# Patient Record
Sex: FEMALE | Race: BLACK OR AFRICAN AMERICAN | NOT HISPANIC OR LATINO | Employment: FULL TIME | URBAN - METROPOLITAN AREA
[De-identification: names, ages, dates, MRNs, and addresses within clinical notes are randomized per-mention and may not be internally consistent; named-entity substitution may affect disease eponyms.]

---

## 2023-12-04 ENCOUNTER — TRANSFERRED RECORDS (OUTPATIENT)
Dept: HEALTH INFORMATION MANAGEMENT | Facility: CLINIC | Age: 48
End: 2023-12-04
Payer: COMMERCIAL

## 2023-12-05 ENCOUNTER — HOSPITAL ENCOUNTER (OUTPATIENT)
Facility: AMBULATORY SURGERY CENTER | Age: 48
End: 2023-12-05
Attending: OPHTHALMOLOGY | Admitting: OPHTHALMOLOGY
Payer: COMMERCIAL

## 2023-12-05 ENCOUNTER — OFFICE VISIT (OUTPATIENT)
Dept: OPHTHALMOLOGY | Facility: CLINIC | Age: 48
End: 2023-12-05
Attending: OPHTHALMOLOGY
Payer: COMMERCIAL

## 2023-12-05 ENCOUNTER — PREP FOR PROCEDURE (OUTPATIENT)
Dept: OPHTHALMOLOGY | Facility: CLINIC | Age: 48
End: 2023-12-05

## 2023-12-05 ENCOUNTER — ANESTHESIA (OUTPATIENT)
Dept: SURGERY | Facility: AMBULATORY SURGERY CENTER | Age: 48
End: 2023-12-05
Payer: COMMERCIAL

## 2023-12-05 ENCOUNTER — ANESTHESIA EVENT (OUTPATIENT)
Dept: SURGERY | Facility: AMBULATORY SURGERY CENTER | Age: 48
End: 2023-12-05
Payer: COMMERCIAL

## 2023-12-05 ENCOUNTER — HOSPITAL ENCOUNTER (OUTPATIENT)
Facility: CLINIC | Age: 48
Discharge: HOME | End: 2023-12-05
Attending: OPHTHALMOLOGY | Admitting: OPHTHALMOLOGY
Payer: COMMERCIAL

## 2023-12-05 ENCOUNTER — HOSPITAL ENCOUNTER (OUTPATIENT)
Facility: AMBULATORY SURGERY CENTER | Age: 48
Discharge: HOME | End: 2023-12-05
Attending: OPHTHALMOLOGY
Payer: COMMERCIAL

## 2023-12-05 ENCOUNTER — TELEPHONE (OUTPATIENT)
Dept: OPHTHALMOLOGY | Facility: CLINIC | Age: 48
End: 2023-12-05

## 2023-12-05 ENCOUNTER — HOSPITAL ENCOUNTER (OUTPATIENT)
Facility: CLINIC | Age: 48
End: 2023-12-05
Attending: OPHTHALMOLOGY | Admitting: OPHTHALMOLOGY
Payer: COMMERCIAL

## 2023-12-05 VITALS — WEIGHT: 151.9 LBS | TEMPERATURE: 98.4 F | BODY MASS INDEX: 24.41 KG/M2 | HEIGHT: 66 IN | RESPIRATION RATE: 18 BRPM

## 2023-12-05 DIAGNOSIS — H33.21 RETINAL DETACHMENT, RIGHT: Primary | ICD-10-CM

## 2023-12-05 DIAGNOSIS — H33.21 RIGHT RETINAL DETACHMENT: ICD-10-CM

## 2023-12-05 PROCEDURE — 99204 OFFICE O/P NEW MOD 45 MIN: CPT | Mod: GC | Performed by: OPHTHALMOLOGY

## 2023-12-05 PROCEDURE — 99214 OFFICE O/P EST MOD 30 MIN: CPT | Performed by: OPHTHALMOLOGY

## 2023-12-05 PROCEDURE — 999N000001 HC CANCELLED SURGERY UP TO 15 MINS: Performed by: OPHTHALMOLOGY

## 2023-12-05 PROCEDURE — 92250 FUNDUS PHOTOGRAPHY W/I&R: CPT | Mod: 59 | Performed by: OPHTHALMOLOGY

## 2023-12-05 PROCEDURE — 92134 CPTRZ OPH DX IMG PST SGM RTA: CPT | Performed by: OPHTHALMOLOGY

## 2023-12-05 PROCEDURE — 99207 FUNDUS PHOTOS OU (BOTH EYES): CPT | Mod: 26 | Performed by: OPHTHALMOLOGY

## 2023-12-05 PROCEDURE — 999N000141 HC STATISTIC PRE-PROCEDURE NURSING ASSESSMENT: Performed by: OPHTHALMOLOGY

## 2023-12-05 ASSESSMENT — CONF VISUAL FIELD
OD_INFERIOR_NASAL_RESTRICTION: 0
OS_NORMAL: 1
OS_SUPERIOR_NASAL_RESTRICTION: 0
METHOD: COUNTING FINGERS
OD_SUPERIOR_TEMPORAL_RESTRICTION: 0
OS_INFERIOR_NASAL_RESTRICTION: 0
OD_INFERIOR_TEMPORAL_RESTRICTION: 0
OD_SUPERIOR_NASAL_RESTRICTION: 0
OS_INFERIOR_TEMPORAL_RESTRICTION: 0
OD_NORMAL: 1
OS_SUPERIOR_TEMPORAL_RESTRICTION: 0

## 2023-12-05 ASSESSMENT — TONOMETRY
OS_IOP_MMHG: 17
OD_IOP_MMHG: 11
IOP_METHOD: TONOPEN

## 2023-12-05 ASSESSMENT — SLIT LAMP EXAM - LIDS
COMMENTS: NORMAL
COMMENTS: NORMAL

## 2023-12-05 ASSESSMENT — VISUAL ACUITY
OD_CC: 20/50
OD_PH_CC+: +1
OS_CC: 20/20
METHOD: SNELLEN - LINEAR
OD_PH_CC: 20/40
CORRECTION_TYPE: GLASSES

## 2023-12-05 ASSESSMENT — CUP TO DISC RATIO
OS_RATIO: 0.4
OD_RATIO: 0.4

## 2023-12-05 ASSESSMENT — ACTIVITIES OF DAILY LIVING (ADL): ADLS_ACUITY_SCORE: 35

## 2023-12-05 ASSESSMENT — EXTERNAL EXAM - RIGHT EYE: OD_EXAM: NORMAL

## 2023-12-05 ASSESSMENT — EXTERNAL EXAM - LEFT EYE: OS_EXAM: NORMAL

## 2023-12-05 NOTE — PROGRESS NOTES
Pre-Operative H & P     CC:  Preoperative exam to assess for increased cardiopulmonary risk while undergoing surgery and anesthesia.    Date of Encounter: 12/5/2023  Primary Care Physician:  No Ref-Primary, Physician     Reason for visit:   Encounter Diagnosis   Name Primary?    Retinal detachment, right Yes       HPI  Camryn Sanon is a 48 year old female who presents for pre-operative H & P in preparation for retinal detachment repair with Dr. Guillermo today.    History is obtained from the patient and chart review      Hx of abnormal bleeding or anti-platelet use: None    Menstrual history: No LMP recorded.: Started 11/11/23.     Prior to Admission Medications  Current Outpatient Medications   Medication Sig Dispense Refill    NO ACTIVE MEDICATIONS       Prenatal Vit-Fe Fumarate-FA (PRENATAL MULTIVITAMIN WITH IRON) 28-0.8 MG TABS Take 1 tablet by mouth daily. 90 tablet 3       Family History  Family History   Problem Relation Age of Onset    Asthma Mother     C.A.D. No family hx of     Diabetes No family hx of     Hypertension No family hx of     Cerebrovascular Disease No family hx of     Breast Cancer No family hx of     Cancer - colorectal No family hx of     Glaucoma No family hx of     Macular Degeneration No family hx of        The complete review of systems is negative other than noted in the HPI or here.       Vitals:  - BP: 127/81  - HR: 113  - SpO2: 99%       Physical Exam  Constitutional: Awake, alert, cooperative, no apparent distress, and appears stated age.  Eyes: Pupils equal, round and reactive to light, extra ocular muscles intact, sclera clear, conjunctiva normal.  HENT: Normocephalic, oral pharynx with moist mucus membranes, good dentition.  Respiratory: Clear to auscultation bilaterally, no crackles or wheezing.  Cardiovascular: Regular rate and rhythm, normal S1 and S2, and no murmur noted.    GI: Normal bowel sounds, soft, non-distended, non-tender.  Lymph/Hematologic: No cervical  lymphadenopathy and no supraclavicular lymphadenopathy.  Genitourinary:  N/A  Skin: Warm and dry.  No rashes at anticipated surgical site.   Musculoskeletal: Full ROM of neck. There is no redness, warmth, or swelling of the joints. Gross motor strength is normal.    Neurologic: Awake, alert, oriented to name, place and time. Cranial nerves II-XII are grossly intact. Gait is normal.   Neuropsychiatric: Calm, cooperative. Normal affect.     PRIOR LABS/DIAGNOSTIC STUDIES:   All labs and imaging personally reviewed     The patient's records and results personally reviewed by this provider.     Outside records reviewed from: care everywhere    LAB/DIAGNOSTIC STUDIES TODAY:  None    ASSESSMENT and PLAN    Plan/Recommendations  Pt will be optimized for the proposed procedure.  See below for details on the assessment, risk, and preoperative recommendations     Eye:  - Past ocular history: No history of surgeries. Presenting for retinal detachment repair in the right eye.    NEUROLOGY  - No h/o CVA, TIA  - Post Op delirium risk factors:  Age     ENT  - No current airway concerns.  Will need to be reassessed day of surgery.  Mallampati: Anesthesia will assess  TM: Anesthesia will assess     CARDIAC  - No h/o CAD/NSTEMI, CHF  - Does not have HLD and has not been on low-dose statin by choice    No history of CHF, Ischemic heart disease, CVA or TIA       PULMONARY  History of allergies and asthma but does not require medications       - Denies asthma or inhaler use  - Tobacco History    Social History     Socioeconomic History    Marital status: Single     Spouse name: None    Number of children: None    Years of education: None    Highest education level: None   Tobacco Use    Smoking status: Never    Smokeless tobacco: Never   Substance and Sexual Activity    Alcohol use: No    Drug use: No    Sexual activity: Yes     Partners: Male     Comment: not using birth control   Other Topics Concern    Parent/sibling w/ CABG, MI or  angioplasty before 65F 55M? No         GI  Denies GERD     /RENAL  - No CKD     ENDOCRINE    There is no height or weight on file to calculate BMI.  - No history of Diabetes Mellitus or thyroid abnormalities     HEME  - No history of abnormal bleeding or antiplatelet use or anticoagulant use.       MSK  - No history of MSK conditions        Kiara Carr MD  Resident Physician, PGY-3  Department of Ophthalmology

## 2023-12-05 NOTE — PROGRESS NOTES
CC -   RD OD    INTERVAL HISTORY - Initial visit with me.  Having pain OD ~ 11/15/23 went to an eye clinic in [facility] ~11/27/23 started an eye gtt, had follow-up exam yesterday, found RD OD, referred to N  No trauma      [facility] -   Camryn FRANKY Sanon is a  48 year old year-old woman with h/o RD OD Dx 12/4/23, macula on, no trauma  Seen in clinic prior to planned surgery with     No DM, no HTn, mild asthma        PAST OCULAR SURGERY  None    RETINAL IMAGING:  OCT 12/5/23  OD - Mild ERM with temporal distortion of contour, no fluid, PVD  OS - retina normal, PHF attached      ASSESSMENT & PLAN    # RRD OD with GRT   - plan PPV/FGx  +/- SBP with SM today     - r/b/a d/w patient: vision loss, blindness, infection, bleeding   - retinal detachment, need for more surgeries, need for gas or oil bubble and bubble restrictions   - cataract, diplopia, refractive change   - persistent blurriness, distortion, or scotoma   - participation/performance by fellow or resident'       - Has not had anything to eat or drink since last night   - See additional note for pre-op H&P      # PVD OD, syneresis OS       # Tr NS OU   - NVS    return to clinic: POD1 with Dr. Mima Carr MD  Resident Physician, PGY-3  Department of Ophthalmology      ATTESTATION     Attending Physician Attestation:      Complete documentation of historical and exam elements from today's encounter can be found in the full encounter summary report (not reduplicated in this progress note).  I personally obtained the chief complaint(s) and history of present illness.  I confirmed and edited as necessary the review of systems, past medical/surgical history, family history, social history, and examination findings as documented by others; and I examined the patient myself.  I personally reviewed the relevant tests, images, and reports as documented above.  I personally reviewed the ophthalmic test(s) associated with this encounter, agree with the  interpretation(s) as documented by the resident/fellow, and have edited the corresponding report(s) as necessary.   I formulated and edited as necessary the assessment and plan and discussed the findings and management plan with the patient and family    Alejandra Augustin MD, PhD  , Vitreoretinal Surgery  Department of Ophthalmology  [facility]

## 2023-12-05 NOTE — PROGRESS NOTES
Patient went home, MD updated and will be in touch with the patient . Face to face time 20 mins in preop with the patient.

## 2023-12-05 NOTE — NURSING NOTE
Chief Complaints and History of Present Illnesses   Patient presents with    Retinal Detachment Evaluation     Chief Complaint(s) and History of Present Illness(es)       Retinal Detachment Evaluation              Laterality: right eye              Comments    Pt sent here from [facility] for RD Right eye. Pt has been NPO since about 9pm last night.  No changes in vision from yesterday to day. No eye pain today. No flashes or floaters.  No DM. Pt stopped all of her prescribed eye drops yesterday.    YANELIS Singer December 5, 2023 7:29 AM

## 2023-12-05 NOTE — PROGRESS NOTES
Patient arrived from the clinic, reports she did not know the extent of her surgery. She does not have anyone to drive her home or stay with her and would like to schedule for another day. MD updated with information

## 2023-12-05 NOTE — ANESTHESIA PREPROCEDURE EVALUATION
Anesthesia Pre-Procedure Evaluation    Patient: Camryn Sanon   MRN: [id number] : 1975        Procedure : Procedure(s):  VITRECTOMY, PARS PLANA APPROACH, USING 25-GAUGE INSTRUMENTS, possible air-fluid exchange, possible gas bubble, possible scleral buckle, possible endolaser          Past Medical History:   Diagnosis Date    Allergy     ASCUS on Pap smear 11    LSIL (low grade squamous intraepithelial lesion) on Pap smear 2011    colp bx neg      Past Surgical History:   Procedure Laterality Date    none        No Known Allergies   Social History     Tobacco Use    Smoking status: Never    Smokeless tobacco: Never   Substance Use Topics    Alcohol use: No      Wt Readings from Last 1 Encounters:   12 72.1 kg (159 lb)        Anesthesia Evaluation            ROS/MED HX  ENT/Pulmonary: Comment: Right Retinal Detachment      Neurologic:  - neg neurologic ROS     Cardiovascular:  - neg cardiovascular ROS     METS/Exercise Tolerance:  Comment: 4 Mets   Hematologic:  - neg hematologic  ROS     Musculoskeletal:  - neg musculoskeletal ROS     GI/Hepatic:  - neg GI/hepatic ROS     Renal/Genitourinary:  - neg Renal ROS     Endo:  - neg endo ROS     Psychiatric/Substance Use:  - neg psychiatric ROS     Infectious Disease:  - neg infectious disease ROS     Malignancy:       Other:               OUTSIDE LABS:  CBC:   Lab Results   Component Value Date    WBC 12.2 (H) 10/08/2011    WBC 8.0 10/07/2011    HGB 8.2 (L) 10/09/2011    HGB 9.1 (L) 10/08/2011    HCT 29.5 (L) 10/08/2011    HCT 32.7 (L) 10/07/2011     10/08/2011     10/07/2011     BMP:   Lab Results   Component Value Date    CR 0.40 (L) 2011     COAGS:   Lab Results   Component Value Date    PTT 28 10/07/2011    INR 1.04 10/07/2011     POC:   Lab Results   Component Value Date    HCG Negative 2012     HEPATIC:   Lab Results   Component Value Date    ALBUMIN 3.2 (L) 2011    PROTTOTAL 6.4 (L) 2011    ALT 14  "06/29/2011    AST 26 06/29/2011    ALKPHOS 101 06/29/2011    BILITOTAL 0.3 06/29/2011     OTHER: No results found for: \"PH\", \"LACT\", \"A1C\", \"MERY\", \"PHOS\", \"MAG\", \"LIPASE\", \"AMYLASE\", \"TSH\", \"T4\", \"T3\", \"CRP\", \"SED\"    Anesthesia Plan    ASA Status:  1       Anesthesia Type: MAC.   Induction: Propofol.   Maintenance: TIVA.        Consents          - Extended Intubation/Ventilatory Support Discussed: No.      - Patient is DNR/DNI Status: No     Use of blood products discussed: No .     Postoperative Care    Pain management: IV analgesics, Oral pain medications.   PONV prophylaxis: Ondansetron (or other 5HT-3), Dexamethasone or Solumedrol     Comments:               Rosa Denny MD    I have reviewed the pertinent notes and labs in the chart from the past 30 days and (re)examined the patient.  Any updates or changes from those notes are reflected in this note.                  "

## 2023-12-06 ENCOUNTER — TELEPHONE (OUTPATIENT)
Dept: OPHTHALMOLOGY | Facility: CLINIC | Age: 48
End: 2023-12-06
Payer: COMMERCIAL

## 2023-12-06 ENCOUNTER — DOCUMENTATION ONLY (OUTPATIENT)
Dept: OPHTHALMOLOGY | Facility: CLINIC | Age: 48
End: 2023-12-06
Payer: COMMERCIAL

## 2023-12-06 RX ORDER — ONDANSETRON 4 MG/1
4 TABLET, ORALLY DISINTEGRATING ORAL EVERY 30 MIN PRN
Status: CANCELLED | OUTPATIENT
Start: 2023-12-06

## 2023-12-06 RX ORDER — ONDANSETRON 2 MG/ML
4 INJECTION INTRAMUSCULAR; INTRAVENOUS EVERY 30 MIN PRN
Status: CANCELLED | OUTPATIENT
Start: 2023-12-06

## 2023-12-06 RX ORDER — OXYCODONE HYDROCHLORIDE 5 MG/1
5 TABLET ORAL
Status: CANCELLED | OUTPATIENT
Start: 2023-12-06

## 2023-12-06 RX ORDER — CYCLOPENTOLAT/TROPIC/PHENYLEPH 1%-1%-2.5%
1 DROPS (EA) OPHTHALMIC (EYE)
Status: CANCELLED | OUTPATIENT
Start: 2023-12-06

## 2023-12-06 RX ORDER — SODIUM CHLORIDE, SODIUM LACTATE, POTASSIUM CHLORIDE, CALCIUM CHLORIDE 600; 310; 30; 20 MG/100ML; MG/100ML; MG/100ML; MG/100ML
INJECTION, SOLUTION INTRAVENOUS CONTINUOUS
Status: CANCELLED | OUTPATIENT
Start: 2023-12-06

## 2023-12-06 RX ORDER — PROPARACAINE HYDROCHLORIDE 5 MG/ML
1 SOLUTION/ DROPS OPHTHALMIC ONCE
Status: CANCELLED | OUTPATIENT
Start: 2023-12-06 | End: 2023-12-06

## 2023-12-06 RX ORDER — OXYCODONE HYDROCHLORIDE 5 MG/1
10 TABLET ORAL
Status: CANCELLED | OUTPATIENT
Start: 2023-12-06

## 2023-12-06 RX ORDER — LIDOCAINE 40 MG/G
CREAM TOPICAL
Status: CANCELLED | OUTPATIENT
Start: 2023-12-06

## 2023-12-06 RX ORDER — ACETAMINOPHEN 325 MG/1
975 TABLET ORAL ONCE
Status: CANCELLED | OUTPATIENT
Start: 2023-12-06 | End: 2023-12-06

## 2023-12-06 NOTE — PROGRESS NOTES
Called Camryn yesterday and today and left her a message with a phone number to call when her symptoms worsens. I told her in the message that we know that retinal detachment will never get better on its own and needs surgical treatment. Kush Navarro MD

## 2023-12-06 NOTE — TELEPHONE ENCOUNTER
LVM for patient to let them know that provider would like to proceed with surgery on 12/7. Will call back later again today.     Patient is schedule for surgery with: Dr. Navarro    Surgery Date: 12/7     [facility]    H&P: already completed by [facility] on 12/5     Post-op: will be scheduled by the clinic    Patient will receive a phone call from pre-admission nurses 1-2 days prior to surgery with arrival time and NPO instructions.    Patient aware times are subject to change up until day before surgery.     Patient questions/concerns: N/A     Surgery packet N/A  (patient was in clinic and sent to [facility] for emergent surgery on  12/5, pt did not have a  with them, so surgery did not happen on 12/5)      Jessie Marvin on 12/6/2023 at 8:34 AM

## 2023-12-07 NOTE — TELEPHONE ENCOUNTER
Left VM for pt to discuss 12/7 surgery. Pt has not returned call from this morning.   Jessie Marvin on 12/7/2023 at 10:07 AM

## 2023-12-11 NOTE — TELEPHONE ENCOUNTER
We called Ms. Sanon after she left [facility] without having her surgery. We discussed with her that her giant retinal tear with retinal detachment will put her at risk of going blind if it does not get treated. Patient was offered to come back to the hospital to expedite her surgery but she stated that she had already eaten. We reinforced again that she will need to come back in the next 2 days for surgical repair and a new surgery day and clinic visit was scheduled. Nevertheless patient did not show up to her clinic appointment or surgical date. We attempted to call her multiple times and left multiple voice messages without a response or call back.    Lauri Marsh MD  PGY-5 Vitreo-retina surgery Fellow  Department of Ophthalmology   [facility]

## 2023-12-13 ENCOUNTER — TELEPHONE (OUTPATIENT)
Dept: OPHTHALMOLOGY | Facility: CLINIC | Age: 48
End: 2023-12-13
Payer: COMMERCIAL

## 2023-12-13 NOTE — TELEPHONE ENCOUNTER
I called pt and left VM urging her to call back as soon as she can for us to schedule surgery to fix her RD. I mentioned we were concerned about permanent vision loss if it was not treated in a timely manner. I left the clinic phone number.

## 2024-04-05 ENCOUNTER — TELEPHONE (OUTPATIENT)
Dept: OPHTHALMOLOGY | Facility: CLINIC | Age: 49
End: 2024-04-05
Payer: COMMERCIAL

## 2024-04-05 NOTE — TELEPHONE ENCOUNTER
ÁNGEL to discuss surgery and scheduling with , letter sent as well to have patient reach out   Left number [phone]      Anna C. Schoenecker on 4/5/2024 at 12:35 PM

## (undated) DEVICE — NDL 18GA 1.5" 305196

## (undated) DEVICE — PACK VITRECTOMY/RET CUSTOM ASC PQ15VRU12

## (undated) DEVICE — APPLICATORS COTTON TIP 6"X2 STERILE LF C15053-006

## (undated) DEVICE — EYE PACK 25GA CONSTELLATION 10,000 CPM PPK9380-04

## (undated) DEVICE — STRAP KNEE/BODY 31143004

## (undated) DEVICE — TAPE MICROPORE 1"X1.5YD 1530S-1

## (undated) DEVICE — EYE CANN SOFT TIP 25GA FOR VALVED SET 8065149530

## (undated) DEVICE — SYR 01ML LL W/O NDL LATEX FREE 309628

## (undated) DEVICE — LINEN TOWEL PACK X5 5464

## (undated) DEVICE — EYE PROBE ESU DIATHERMY DSP 25GA 339.21

## (undated) DEVICE — SU PLAIN 6-0 TG140-8 18" 1735G

## (undated) DEVICE — EYE SOL BSS 500ML BAG 0065-1795-04

## (undated) DEVICE — TAPE DURAPORE 2"X1.5YD SILK 1538S-2

## (undated) DEVICE — GLOVE BIOGEL PI MICRO SZ 6.0 48560

## (undated) RX ORDER — FENTANYL CITRATE 50 UG/ML
INJECTION, SOLUTION INTRAMUSCULAR; INTRAVENOUS
Status: DISPENSED
Start: 2023-12-05